# Patient Record
Sex: FEMALE | HISPANIC OR LATINO | URBAN - METROPOLITAN AREA
[De-identification: names, ages, dates, MRNs, and addresses within clinical notes are randomized per-mention and may not be internally consistent; named-entity substitution may affect disease eponyms.]

---

## 2023-01-04 ENCOUNTER — OFFICE VISIT (OUTPATIENT)
Dept: URGENT CARE | Facility: CLINIC | Age: 33
End: 2023-01-04

## 2023-01-04 VITALS
HEIGHT: 66 IN | DIASTOLIC BLOOD PRESSURE: 53 MMHG | RESPIRATION RATE: 18 BRPM | SYSTOLIC BLOOD PRESSURE: 114 MMHG | TEMPERATURE: 96.7 F | HEART RATE: 77 BPM | OXYGEN SATURATION: 98 %

## 2023-01-04 DIAGNOSIS — M25.561 PAIN IN BOTH KNEES, UNSPECIFIED CHRONICITY: Primary | ICD-10-CM

## 2023-01-04 DIAGNOSIS — M25.562 PAIN IN BOTH KNEES, UNSPECIFIED CHRONICITY: Primary | ICD-10-CM

## 2023-01-04 RX ORDER — MELOXICAM 15 MG/1
15 TABLET ORAL DAILY
Qty: 15 TABLET | Refills: 0 | Status: SHIPPED | OUTPATIENT
Start: 2023-01-04 | End: 2023-01-07

## 2023-01-04 RX ORDER — OMEGA-3-ACID ETHYL ESTERS 1 G/1
2 CAPSULE, LIQUID FILLED ORAL 2 TIMES DAILY
COMMUNITY

## 2023-01-04 NOTE — PROGRESS NOTES
[facility]        NAME: Matilde Houston is a 27 y o  female  : 1990    MRN: [id number]  DATE: 2023  TIME: 2:52 PM    Assessment and Plan   Pain in both knees, unspecified chronicity [M25 561, M25 562]  1  Pain in both knees, unspecified chronicity  meloxicam (Mobic) 15 mg tablet            Patient Instructions       Follow up with PCP if no improvement in 10-14 days    Chief Complaint     Chief Complaint   Patient presents with   • Knee Pain     Back of knee pain ; has had this pain for a long time but worse over the past 2 days          History of Present Illness         #[id number]  Patient complains of bilateral knee pain left greater than right for many months  She states over the last week they have been bothering her more because she is on her feet all the time  She did try some Advil once which did not help  No trauma  Knee Pain   The incident occurred more than 1 week ago  There was no injury mechanism  The pain is present in the right knee and left knee  The quality of the pain is described as aching  The pain is moderate  Pertinent negatives include no inability to bear weight, loss of motion, loss of sensation, muscle weakness, numbness or tingling  The symptoms are aggravated by movement and weight bearing  Review of Systems   Review of Systems   Neurological: Negative for tingling and numbness  All other systems reviewed and are negative          Current Medications       Current Outpatient Medications:   •  meloxicam (Mobic) 15 mg tablet, Take 1 tablet (15 mg total) by mouth daily for 15 days, Disp: 15 tablet, Rfl: 0  •  omega-3-acid ethyl esters (LOVAZA) 1 g capsule, Take 2 g by mouth 2 (two) times a day, Disp: , Rfl:     Current Allergies     Allergies as of 2023   • (No Known Allergies)            The following portions of the patient's history were reviewed and updated as appropriate: allergies, current medications, past family history, past medical history, past social history, past surgical history and problem list      Past Medical History:   Diagnosis Date   • No known health problems        Past Surgical History:   Procedure Laterality Date   • NO PAST SURGERIES         History reviewed  No pertinent family history  Medications have been verified  Objective   /53   Pulse 77   Temp (!) 96 7 °F (35 9 °C)   Resp 18   Ht 5' 6" (1 676 m)   LMP 12/04/2022   SpO2 98%   Patient's last menstrual period was 12/04/2022  Physical Exam     Physical Exam  Vitals and nursing note reviewed  Constitutional:       Appearance: Normal appearance  She is normal weight  Cardiovascular:      Rate and Rhythm: Normal rate and regular rhythm  Pulses: Normal pulses  Heart sounds: Normal heart sounds  Pulmonary:      Effort: Pulmonary effort is normal       Breath sounds: Normal breath sounds  Neurological:      Mental Status: She is alert  Both knees full range of motion, no edema no effusion no instability  No tenderness medially or laterally  Patella tracks midline bilaterally

## 2023-01-07 RX ORDER — MELOXICAM 15 MG/1
15 TABLET ORAL DAILY
Qty: 15 TABLET | Refills: 0 | Status: SHIPPED | OUTPATIENT
Start: 2023-01-07 | End: 2023-01-22